# Patient Record
Sex: FEMALE | Race: OTHER | NOT HISPANIC OR LATINO | Employment: UNEMPLOYED | URBAN - METROPOLITAN AREA
[De-identification: names, ages, dates, MRNs, and addresses within clinical notes are randomized per-mention and may not be internally consistent; named-entity substitution may affect disease eponyms.]

---

## 2024-09-16 ENCOUNTER — TELEPHONE (OUTPATIENT)
Dept: DIABETES | Facility: CLINIC | Age: 34
End: 2024-09-16
Payer: MEDICAID

## 2024-09-16 NOTE — TELEPHONE ENCOUNTER
Advised patient we have not received referral for DM from [facility]. Patient stated she has private ins and medicaid. Informed patient she will need to update insurance and have referral faxed to office. Correct fx# given.

## 2024-09-16 NOTE — TELEPHONE ENCOUNTER
----- Message from Monica Clark sent at 9/16/2024  9:32 AM CDT -----  The pt is looking to get a call back in regards to getting established. She has her provider fax over her records and she hasn't heard anything. Please give a call back at [phone] to better assist her.

## 2024-11-05 ENCOUNTER — PATIENT MESSAGE (OUTPATIENT)
Dept: RESEARCH | Facility: HOSPITAL | Age: 34
End: 2024-11-05
Payer: MEDICAID